# Patient Record
Sex: FEMALE | Race: BLACK OR AFRICAN AMERICAN | URBAN - METROPOLITAN AREA
[De-identification: names, ages, dates, MRNs, and addresses within clinical notes are randomized per-mention and may not be internally consistent; named-entity substitution may affect disease eponyms.]

---

## 2018-11-09 ENCOUNTER — OFFICE VISIT (OUTPATIENT)
Dept: OBGYN CLINIC | Age: 18
End: 2018-11-09

## 2018-11-09 VITALS
SYSTOLIC BLOOD PRESSURE: 110 MMHG | HEIGHT: 61 IN | DIASTOLIC BLOOD PRESSURE: 60 MMHG | BODY MASS INDEX: 31.34 KG/M2 | WEIGHT: 166 LBS

## 2018-11-09 DIAGNOSIS — N94.89 LABIAL SWELLING: ICD-10-CM

## 2018-11-09 RX ORDER — NORGESTIMATE AND ETHINYL ESTRADIOL 0.25-0.035
KIT ORAL
Refills: 4 | COMMUNITY
Start: 2018-10-13

## 2018-11-09 NOTE — PROGRESS NOTES
Vulvar lesion evaluation    Gamaliel Valdes is a 19 y.o. female  Patient's last menstrual period was 10/13/2018 (exact date). who presents with a lesion on her left labia. She noticed it 3 days ago. Saw primary, who started antibiotics. The lesion has shown the following change: increasing diameter. No new lesions have developed. She reports the following associated symptoms: pain and swelling, which came up over the day 3 days ago. She denies the following associated symptoms: itching, drainage, erythema  Aggravating factors: none. Alleviating factors: none. History reviewed. No pertinent past medical history. History reviewed. No pertinent surgical history. Social History     Occupational History    Not on file   Tobacco Use    Smoking status: Never Smoker    Smokeless tobacco: Never Used   Substance and Sexual Activity    Alcohol use: No     Frequency: Never    Drug use: No    Sexual activity: Yes     Partners: Male     Birth control/protection: Pill     History reviewed. No pertinent family history. No Known Allergies  Prior to Admission medications    Medication Sig Start Date End Date Taking?  Authorizing Provider   [address] [place] [address], 0.25-35 mg-mcg tab TAKE 1 TABLET BY MOUTH DAILY 10/13/18  Yes Provider, Historical        Review of Systems: History obtained from the patient  Constitutional: negative for weight loss, fever, night sweats  GI: negative for change in bowel habits, abdominal pain, black or bloody stools  : negative for frequency, dysuria, hematuria, vaginal discharge  MSK: negative for back pain, joint pain, muscle pain  Skin: negative for itching, rash, hives elsewhere  Neuro: negative for dizziness, headache, confusion, weakness  Psych: negative for anxiety, depression, change in mood  Heme/lymph: negative for bleeding, bruising, pallor    Objective:  Visit Vitals  /60   Ht 5' 1\" (1.549 m)   Wt 166 lb (75.3 kg)   LMP 10/13/2018 (Exact Date)   BMI 31.37 kg/m² Physical Exam:   PHYSICAL EXAMINATION    Constitutional  · Appearance: well-nourished, well developed, alert, in no acute distress    Genitourinary  · External Genitalia: left labia minora swollen to twice normal size, tender to touch but not hot, red, and with no inflammatory lesions present, no other masses present, no atrophy present  · Vagina: normal vaginal vault without central or paravaginal defects, no discharge present, no inflammatory lesions present, no masses present  · Bladder: non-tender to palpation  · Urethra: appears normal  · Perineum: perineum within normal limits, no evidence of trauma, no rashes or skin lesions present  · Anus: anus within normal limits, no hemorrhoids present  · Inguinal Lymph Nodes: no lymphadenopathy present    Skin  · General Inspection: no rash, no lesions identified    Neurologic/Psychiatric  · Mental Status:  · Orientation: grossly oriented to person, place and time  · Mood and Affect: mood normal, affect appropriate    Assessment:   Left labia minora swelling without sign of viral or bacterial infection. Suggested continue antibiotics even though no infection noted. Plan:   Rx Aleve, 2 q 8 hours. Sitz bath and ice packs tid. No work for 4 days. Call us Monday with how she's doing. RTO prn if symptoms persist or worsen. Instructions given to pt. Handouts given to pt.

## 2018-11-09 NOTE — LETTER
NOTIFICATION RETURN TO SCHOOL 
 
11/9/2018 2:44 PM 
 
Ms. Nikki Bocanegra [address] [place] [address] [phone] To Whom It May Concern: 
 
Nikki Bocanegra is currently under the care of [address] [place] [address]. She was seen in our office today. She may return to school on: Tuesday, November 13,2018 If there are questions or concerns please have the patient contact our office. Sincerely, Sharmaine Hong MD